# Patient Record
Sex: FEMALE | Race: BLACK OR AFRICAN AMERICAN | NOT HISPANIC OR LATINO | Employment: FULL TIME | URBAN - METROPOLITAN AREA
[De-identification: names, ages, dates, MRNs, and addresses within clinical notes are randomized per-mention and may not be internally consistent; named-entity substitution may affect disease eponyms.]

---

## 2023-02-28 ENCOUNTER — HOSPITAL ENCOUNTER (EMERGENCY)
Facility: HOSPITAL | Age: 28
Discharge: HOME | End: 2023-02-28
Attending: EMERGENCY MEDICINE | Admitting: EMERGENCY MEDICINE

## 2023-02-28 VITALS
HEART RATE: 85 BPM | RESPIRATION RATE: 16 BRPM | OXYGEN SATURATION: 98 % | TEMPERATURE: 98.6 F | SYSTOLIC BLOOD PRESSURE: 134 MMHG | DIASTOLIC BLOOD PRESSURE: 88 MMHG

## 2023-02-28 DIAGNOSIS — A08.4 VIRAL GASTROENTERITIS: Primary | ICD-10-CM

## 2023-02-28 LAB
ALBUMIN SERPL BCP-MCNC: 4.2 G/DL (ref 3.5–5)
ALP SERPL-CCNC: 45 U/L (ref 34–104)
ALT SERPL W P-5'-P-CCNC: 11 U/L (ref 7–52)
ANION GAP SERPL CALCULATED.3IONS-SCNC: 9 MMOL/L (ref 4–13)
AST SERPL W P-5'-P-CCNC: 16 U/L (ref 13–39)
BASOPHILS # BLD AUTO: 0.04 THOUSANDS/ÂΜL (ref 0–0.1)
BASOPHILS NFR BLD AUTO: 0 % (ref 0–1)
BILIRUB SERPL-MCNC: 0.55 MG/DL (ref 0.2–1)
BUN SERPL-MCNC: 8 MG/DL (ref 5–25)
CALCIUM SERPL-MCNC: 9.5 MG/DL (ref 8.4–10.2)
CHLORIDE SERPL-SCNC: 100 MMOL/L (ref 96–108)
CO2 SERPL-SCNC: 23 MMOL/L (ref 21–32)
CREAT SERPL-MCNC: 0.81 MG/DL (ref 0.6–1.3)
EOSINOPHIL # BLD AUTO: 0.15 THOUSAND/ÂΜL (ref 0–0.61)
EOSINOPHIL NFR BLD AUTO: 2 % (ref 0–6)
ERYTHROCYTE [DISTWIDTH] IN BLOOD BY AUTOMATED COUNT: 12.6 % (ref 11.6–15.1)
GFR SERPL CREATININE-BSD FRML MDRD: 98 ML/MIN/1.73SQ M
GLUCOSE SERPL-MCNC: 86 MG/DL (ref 65–140)
HCG SERPL QL: NEGATIVE
HCT VFR BLD AUTO: 41.5 % (ref 34.8–46.1)
HGB BLD-MCNC: 14.3 G/DL (ref 11.5–15.4)
IMM GRANULOCYTES # BLD AUTO: 0.02 THOUSAND/UL (ref 0–0.2)
IMM GRANULOCYTES NFR BLD AUTO: 0 % (ref 0–2)
LIPASE SERPL-CCNC: 110 U/L (ref 11–82)
LYMPHOCYTES # BLD AUTO: 2.25 THOUSANDS/ÂΜL (ref 0.6–4.47)
LYMPHOCYTES NFR BLD AUTO: 24 % (ref 14–44)
MCH RBC QN AUTO: 30 PG (ref 26.8–34.3)
MCHC RBC AUTO-ENTMCNC: 34.5 G/DL (ref 31.4–37.4)
MCV RBC AUTO: 87 FL (ref 82–98)
MONOCYTES # BLD AUTO: 0.75 THOUSAND/ÂΜL (ref 0.17–1.22)
MONOCYTES NFR BLD AUTO: 8 % (ref 4–12)
NEUTROPHILS # BLD AUTO: 6.15 THOUSANDS/ÂΜL (ref 1.85–7.62)
NEUTS SEG NFR BLD AUTO: 66 % (ref 43–75)
NRBC BLD AUTO-RTO: 0 /100 WBCS
PLATELET # BLD AUTO: 433 THOUSANDS/UL (ref 149–390)
PMV BLD AUTO: 8.5 FL (ref 8.9–12.7)
POTASSIUM SERPL-SCNC: 3.7 MMOL/L (ref 3.5–5.3)
PROT SERPL-MCNC: 7.7 G/DL (ref 6.4–8.4)
RBC # BLD AUTO: 4.77 MILLION/UL (ref 3.81–5.12)
SODIUM SERPL-SCNC: 132 MMOL/L (ref 135–147)
WBC # BLD AUTO: 9.36 THOUSAND/UL (ref 4.31–10.16)

## 2023-02-28 RX ORDER — ONDANSETRON 2 MG/ML
4 INJECTION INTRAMUSCULAR; INTRAVENOUS ONCE
Status: COMPLETED | OUTPATIENT
Start: 2023-02-28 | End: 2023-02-28

## 2023-02-28 RX ORDER — DICYCLOMINE HCL 20 MG
20 TABLET ORAL 2 TIMES DAILY
Qty: 20 TABLET | Refills: 0 | Status: SHIPPED | OUTPATIENT
Start: 2023-02-28

## 2023-02-28 RX ORDER — ONDANSETRON 4 MG/1
4 TABLET, ORALLY DISINTEGRATING ORAL EVERY 6 HOURS PRN
Qty: 20 TABLET | Refills: 0 | Status: SHIPPED | OUTPATIENT
Start: 2023-02-28

## 2023-02-28 RX ORDER — KETOROLAC TROMETHAMINE 30 MG/ML
15 INJECTION, SOLUTION INTRAMUSCULAR; INTRAVENOUS ONCE
Status: COMPLETED | OUTPATIENT
Start: 2023-02-28 | End: 2023-02-28

## 2023-02-28 RX ADMIN — SODIUM CHLORIDE 1000 ML: 0.9 INJECTION, SOLUTION INTRAVENOUS at 19:03

## 2023-02-28 RX ADMIN — KETOROLAC TROMETHAMINE 15 MG: 30 INJECTION, SOLUTION INTRAMUSCULAR at 19:04

## 2023-02-28 RX ADMIN — ONDANSETRON 4 MG: 2 INJECTION INTRAMUSCULAR; INTRAVENOUS at 19:04

## 2023-03-01 NOTE — DISCHARGE INSTRUCTIONS
Please return to the emergency department for worsening symptoms including chest pain, shortness of breath, dizziness, lightheadedness, fever greater than 103, severe pain, inability to walk, fainting episodes, etc  Please follow-up with your family practice provider as soon as possible  I have sent medications over to the pharmacy for your symptoms  Please take as directed

## 2023-03-01 NOTE — ED PROVIDER NOTES
History  Chief Complaint   Patient presents with   • Diarrhea     Pt states "I believe I have food poisoning" She has had 2 episodes of diarrhea no N/V  This is a 43-year-old female with no significant past medical history presenting to the emergency department today for nausea and diarrhea since yesterday  The patient notes she had it at [facility] and she believes that she got sick from this  She notes nonbloody diarrhea associated with generalized abdominal cramping  She notes nausea without any episodes of vomiting  She has no fever, chills, chest pain, or shortness of breath  She denies dysuria, hematuria, foul-smelling urine, vaginal bleeding, or vaginal discharge  She denies any prior abdominal surgical history  Nobody else is sick that the patient has been in contact with  The patient denies other complaints at this time  History provided by:  Patient   used: No    Diarrhea  Quality:  Watery  Severity:  Moderate  Onset quality:  Gradual  Timing:  Intermittent  Progression:  Worsening  Relieved by:  Nothing  Worsened by:  Nothing  Ineffective treatments:  None tried  Associated symptoms: abdominal pain ("cramping")    Associated symptoms: no arthralgias, no chills, no recent cough, no diaphoresis, no fever, no headaches, no myalgias, no URI and no vomiting    Risk factors: suspect food intake    Risk factors: no sick contacts        Prior to Admission Medications   Prescriptions Last Dose Informant Patient Reported? Taking?    Prenatal Vit-Fe Fumarate-FA (Prenatal Complete) 14-0 4 MG TABS   No No   Sig: Take 1 tablet by mouth daily   ibuprofen (MOTRIN) 600 mg tablet   No No   Sig: Take 1 tablet (600 mg total) by mouth every 6 (six) hours as needed for moderate pain for up to 5 days   metoclopramide (REGLAN) 10 mg tablet   No No   Sig: Take 1 tablet (10 mg total) by mouth every 6 (six) hours as needed (vomiting) for up to 12 doses   Patient not taking: Reported on 7/23/2021 naproxen (NAPROSYN) 500 mg tablet   No No   Sig: Take 1 tablet (500 mg total) by mouth 2 (two) times a day with meals   Patient not taking: Reported on 2021   norgestimate-ethinyl estradiol (Tri Femynor) 0 18/0 215/0 25 MG-35 MCG per tablet   No No   Sig: Take 1 tablet by mouth daily   Patient not taking: Reported on 2021   ondansetron (ZOFRAN-ODT) 4 mg disintegrating tablet   No No   Sig: Take 1 tablet (4 mg total) by mouth every 8 (eight) hours as needed for nausea or vomiting for up to 7 days      Facility-Administered Medications: None       Past Medical History:   Diagnosis Date   • Abnormal Pap smear of cervix    • HPV (human papilloma virus) infection    • Urinary tract infection    • Varicella     had the chicken pox       Past Surgical History:   Procedure Laterality Date   • INDUCED       by dilation and curettage       Family History   Problem Relation Age of Onset   • Asthma Mother    • Stroke Father    • Dementia Father    • Asthma Sister    • No Known Problems Brother    • Aneurysm Maternal Grandmother    • Hypertension Maternal Grandfather    • Stroke Paternal Grandfather    • No Known Problems Sister    • No Known Problems Sister    • No Known Problems Brother      I have reviewed and agree with the history as documented  E-Cigarette/Vaping   • E-Cigarette Use Never User      E-Cigarette/Vaping Substances     Social History     Tobacco Use   • Smoking status: Never   • Smokeless tobacco: Never   • Tobacco comments:     former smoker/ light smoker per [employer]    Vaping Use   • Vaping Use: Never used   Substance Use Topics   • Alcohol use: Yes     Comment: Socially   • Drug use: No       Review of Systems   Constitutional: Negative for appetite change, chills, diaphoresis, fatigue and fever  Eyes: Negative for visual disturbance  Respiratory: Negative for cough, chest tightness, shortness of breath and wheezing      Cardiovascular: Negative for chest pain, palpitations and leg swelling  Gastrointestinal: Positive for abdominal pain ("cramping"), diarrhea and nausea  Negative for constipation and vomiting  Musculoskeletal: Negative for arthralgias, myalgias, neck pain and neck stiffness  Skin: Negative for rash and wound  Neurological: Negative for dizziness, seizures, syncope, weakness, light-headedness, numbness and headaches  Psychiatric/Behavioral: Negative for confusion  All other systems reviewed and are negative  Physical Exam  Physical Exam  Vitals and nursing note reviewed  Constitutional:       General: She is not in acute distress  Appearance: Normal appearance  She is normal weight  She is not ill-appearing, toxic-appearing or diaphoretic  HENT:      Head: Normocephalic and atraumatic  Nose: Nose normal  No congestion or rhinorrhea  Mouth/Throat:      Mouth: Mucous membranes are moist       Pharynx: No oropharyngeal exudate or posterior oropharyngeal erythema  Eyes:      General: No scleral icterus  Right eye: No discharge  Left eye: No discharge  Extraocular Movements: Extraocular movements intact  Pupils: Pupils are equal, round, and reactive to light  Cardiovascular:      Rate and Rhythm: Normal rate and regular rhythm  Pulses: Normal pulses  Heart sounds: Normal heart sounds  No murmur heard  No friction rub  No gallop  Pulmonary:      Effort: Pulmonary effort is normal  No respiratory distress  Breath sounds: Normal breath sounds  No stridor  No wheezing, rhonchi or rales  Chest:      Chest wall: No tenderness  Abdominal:      General: Abdomen is flat  There is no distension  Palpations: Abdomen is soft  Tenderness: There is no abdominal tenderness  There is no right CVA tenderness, left CVA tenderness, guarding or rebound  Comments: Abdomen is soft, nontender, nondistended, and without organomegaly   Musculoskeletal:         General: Normal range of motion  Cervical back: Normal range of motion  No tenderness  Right lower leg: No edema  Left lower leg: No edema  Skin:     General: Skin is warm and dry  Capillary Refill: Capillary refill takes less than 2 seconds  Coloration: Skin is not jaundiced or pale  Neurological:      General: No focal deficit present  Mental Status: She is alert and oriented to person, place, and time  Mental status is at baseline     Psychiatric:         Mood and Affect: Mood normal          Behavior: Behavior normal          Vital Signs  ED Triage Vitals [02/28/23 1829]   Temperature Pulse Respirations Blood Pressure SpO2   98 6 °F (37 °C) 85 16 134/88 98 %      Temp Source Heart Rate Source Patient Position - Orthostatic VS BP Location FiO2 (%)   Oral -- Lying Right arm --      Pain Score       6           Vitals:    02/28/23 1829   BP: 134/88   Pulse: 85   Patient Position - Orthostatic VS: Lying         Visual Acuity      ED Medications  Medications   sodium chloride 0 9 % bolus 1,000 mL (0 mL Intravenous Stopped 2/28/23 2001)   ondansetron (ZOFRAN) injection 4 mg (4 mg Intravenous Given 2/28/23 1904)   ketorolac (TORADOL) injection 15 mg (15 mg Intravenous Given 2/28/23 1904)       Diagnostic Studies  Results Reviewed     Procedure Component Value Units Date/Time    hCG, qualitative pregnancy [[id number]]  (Normal) Collected: 02/28/23 1907    Lab Status: Final result Specimen: Blood from Arm, Right Updated: 02/28/23 1935     Preg, Serum Negative    Comprehensive metabolic panel [[id number]]  (Abnormal) Collected: 02/28/23 1907    Lab Status: Final result Specimen: Blood from Arm, Right Updated: 02/28/23 1930     Sodium 132 mmol/L      Potassium 3 7 mmol/L      Chloride 100 mmol/L      CO2 23 mmol/L      ANION GAP 9 mmol/L      BUN 8 mg/dL      Creatinine 0 81 mg/dL      Glucose 86 mg/dL      Calcium 9 5 mg/dL      AST 16 U/L      ALT 11 U/L      Alkaline Phosphatase 45 U/L      Total Protein 7 7 g/dL      Albumin 4 2 g/dL      Total Bilirubin 0 55 mg/dL      eGFR 98 ml/min/1 73sq m     Narrative:      National Kid[employer] guidelines for Chronic Kidney Disease (CKD):   •  Stage 1 with normal or high GFR (GFR > 90 mL/min/1 73 square meters)  •  Stage 2 Mild CKD (GFR = 60-89 mL/min/1 73 square meters)  •  Stage 3A Moderate CKD (GFR = 45-59 mL/min/1 73 square meters)  •  Stage 3B Moderate CKD (GFR = 30-44 mL/min/1 73 square meters)  •  Stage 4 Severe CKD (GFR = 15-29 mL/min/1 73 square meters)  •  Stage 5 End Stage CKD (GFR <15 mL/min/1 73 square meters)  Note: GFR calculation is accurate only with a steady state creatinine    Lipase [[id number]]  (Abnormal) Collected: 02/28/23 1907    Lab Status: Final result Specimen: Blood from Arm, Right Updated: 02/28/23 1930     Lipase 110 u/L     CBC and differential [[id number]]  (Abnormal) Collected: 02/28/23 1907    Lab Status: Final result Specimen: Blood from Arm, Right Updated: 02/28/23 1911     WBC 9 36 Thousand/uL      RBC 4 77 Million/uL      Hemoglobin 14 3 g/dL      Hematocrit 41 5 %      MCV 87 fL      MCH 30 0 pg      MCHC 34 5 g/dL      RDW 12 6 %      MPV 8 5 fL      Platelets 873 Thousands/uL      nRBC 0 /100 WBCs      Neutrophils Relative 66 %      Immat GRANS % 0 %      Lymphocytes Relative 24 %      Monocytes Relative 8 %      Eosinophils Relative 2 %      Basophils Relative 0 %      Neutrophils Absolute 6 15 Thousands/µL      Immature Grans Absolute 0 02 Thousand/uL      Lymphocytes Absolute 2 25 Thousands/µL      Monocytes Absolute 0 75 Thousand/µL      Eosinophils Absolute 0 15 Thousand/µL      Basophils Absolute 0 04 Thousands/µL                  No orders to display              Procedures  Procedures         ED Course                                             Medical Decision Making  This is a 79-year-old female presenting to the emergency department today for nonbloody diarrhea associated with nausea  Suspicous food intake yesterday    Nobody else is sick  No episodes of vomiting  No fevers  No abdominal surgical history  Vitals are stable  The patient has generalized abdominal cramping but abdominal examination is benign  Her abdomen is soft, nontender, nondistended, and without organomegaly  Labs show mild hyponatremia and mild elevation to lipase but is not definitive for pancreatitis  The patient was given intravenous fluids in addition to Zofran and Toradol while here in the emergency department  This made the patient feel better  Negative pregnancy test   The patient is stable for discharge at this time  Zofran and Bentyl sent to the patient's pharmacy  Push fluids  [place] diet and advance as tolerated  Strict return precautions were given  Recommend PCP follow-up as soon as possible  The patient and/or patient's proxy verify their understanding and agree to the plan at this time  All questions answered to the patient and/or their proxy's satisfaction  All labs reviewed and utilized in the medical decision making process (if labs were ordered)  Portions of the record may have been created with voice recognition software   Occasional wrong word or "sound a like" substitutions may have occurred due to the inherent limitations of voice recognition software   Read the chart carefully and recognize, using context, where substitutions have occurred  Viral gastroenteritis: complicated acute illness or injury  Amount and/or Complexity of Data Reviewed  Labs: ordered  Decision-making details documented in ED Course  Risk  Prescription drug management            Disposition  Final diagnoses:   Viral gastroenteritis     Time reflects when diagnosis was documented in both MDM as applicable and the Disposition within this note     Time User Action Codes Description Comment    2/28/2023  7:45 PM Britni Hill Add [A08 4] Viral gastroenteritis       ED Disposition     ED Disposition   Discharge    Condition   Stable    Date/Time   Tue Feb 28, 2023  7:44 PM    Comment   Roseannekari Tobar discharge to home/self care  Follow-up Information     Follow up With Specialties Details Why Contact Info Additional Information    Dell Zamora MD Internal Medicine Schedule an appointment as soon as possible for a visit   [address] [facility] Emergency Department Emergency Medicine Go to  If symptoms worsen [address] [id number]  [address] [facility] Emergency Department, [address], [place] [address]          Patient's Medications   Discharge Prescriptions    DICYCLOMINE (BENTYL) 20 MG TABLET    Take 1 tablet (20 mg total) by mouth 2 (two) times a day       Start Date: 2/28/2023 End Date: --       Order Dose: 20 mg       Quantity: 20 tablet    Refills: 0    ONDANSETRON (ZOFRAN-ODT) 4 MG DISINTEGRATING TABLET    Take 1 tablet (4 mg total) by mouth every 6 (six) hours as needed for nausea or vomiting       Start Date: 2/28/2023 End Date: --       Order Dose: 4 mg       Quantity: 20 tablet    Refills: 0       No discharge procedures on file      PDMP Review     None          ED Provider  Electronically Signed by           Connie Cano PA-C  02/28/23 2012

## 2024-01-20 ENCOUNTER — APPOINTMENT (EMERGENCY)
Dept: CT IMAGING | Facility: HOSPITAL | Age: 29
End: 2024-01-20
Payer: COMMERCIAL

## 2024-01-20 ENCOUNTER — HOSPITAL ENCOUNTER (EMERGENCY)
Facility: HOSPITAL | Age: 29
Discharge: HOME | End: 2024-01-20
Attending: EMERGENCY MEDICINE
Payer: COMMERCIAL

## 2024-01-20 ENCOUNTER — APPOINTMENT (EMERGENCY)
Dept: ULTRASOUND IMAGING | Facility: HOSPITAL | Age: 29
End: 2024-01-20
Payer: COMMERCIAL

## 2024-01-20 VITALS
RESPIRATION RATE: 18 BRPM | SYSTOLIC BLOOD PRESSURE: 131 MMHG | DIASTOLIC BLOOD PRESSURE: 79 MMHG | OXYGEN SATURATION: 99 % | HEART RATE: 88 BPM | TEMPERATURE: 98.2 F

## 2024-01-20 DIAGNOSIS — R11.0 NAUSEA: ICD-10-CM

## 2024-01-20 DIAGNOSIS — N83.209 OVARIAN CYST: ICD-10-CM

## 2024-01-20 DIAGNOSIS — R10.9 ABDOMINAL PAIN: Primary | ICD-10-CM

## 2024-01-20 LAB
ALBUMIN SERPL BCP-MCNC: 4.2 G/DL (ref 3.5–5)
ALP SERPL-CCNC: 54 U/L (ref 34–104)
ALT SERPL W P-5'-P-CCNC: 12 U/L (ref 7–52)
ANION GAP SERPL CALCULATED.3IONS-SCNC: 9 MMOL/L
AST SERPL W P-5'-P-CCNC: 17 U/L (ref 13–39)
BACTERIA UR QL AUTO: ABNORMAL /HPF
BASOPHILS # BLD AUTO: 0.06 THOUSANDS/ÂΜL (ref 0–0.1)
BASOPHILS NFR BLD AUTO: 1 % (ref 0–1)
BILIRUB DIRECT SERPL-MCNC: 0.11 MG/DL (ref 0–0.2)
BILIRUB SERPL-MCNC: 0.34 MG/DL (ref 0.2–1)
BILIRUB UR QL STRIP: NEGATIVE
BUN SERPL-MCNC: 8 MG/DL (ref 5–25)
CALCIUM SERPL-MCNC: 9.3 MG/DL (ref 8.4–10.2)
CHLORIDE SERPL-SCNC: 102 MMOL/L (ref 96–108)
CLARITY UR: CLEAR
CO2 SERPL-SCNC: 23 MMOL/L (ref 21–32)
COLOR UR: YELLOW
CREAT SERPL-MCNC: 0.8 MG/DL (ref 0.6–1.3)
EOSINOPHIL # BLD AUTO: 0.43 THOUSAND/ÂΜL (ref 0–0.61)
EOSINOPHIL NFR BLD AUTO: 5 % (ref 0–6)
ERYTHROCYTE [DISTWIDTH] IN BLOOD BY AUTOMATED COUNT: 12.8 % (ref 11.6–15.1)
EXT PREGNANCY TEST URINE: NEGATIVE
EXT. CONTROL: NORMAL
GFR SERPL CREATININE-BSD FRML MDRD: 99 ML/MIN/1.73SQ M
GLUCOSE SERPL-MCNC: 93 MG/DL (ref 65–140)
GLUCOSE UR STRIP-MCNC: NEGATIVE MG/DL
HCG SERPL QL: NEGATIVE
HCT VFR BLD AUTO: 40.4 % (ref 34.8–46.1)
HGB BLD-MCNC: 13.5 G/DL (ref 11.5–15.4)
HGB UR QL STRIP.AUTO: ABNORMAL
IMM GRANULOCYTES # BLD AUTO: 0.02 THOUSAND/UL (ref 0–0.2)
IMM GRANULOCYTES NFR BLD AUTO: 0 % (ref 0–2)
KETONES UR STRIP-MCNC: ABNORMAL MG/DL
LEUKOCYTE ESTERASE UR QL STRIP: NEGATIVE
LIPASE SERPL-CCNC: 29 U/L (ref 11–82)
LYMPHOCYTES # BLD AUTO: 2.08 THOUSANDS/ÂΜL (ref 0.6–4.47)
LYMPHOCYTES NFR BLD AUTO: 22 % (ref 14–44)
MCH RBC QN AUTO: 30.1 PG (ref 26.8–34.3)
MCHC RBC AUTO-ENTMCNC: 33.4 G/DL (ref 31.4–37.4)
MCV RBC AUTO: 90 FL (ref 82–98)
MONOCYTES # BLD AUTO: 0.82 THOUSAND/ÂΜL (ref 0.17–1.22)
MONOCYTES NFR BLD AUTO: 9 % (ref 4–12)
MUCOUS THREADS UR QL AUTO: ABNORMAL
NEUTROPHILS # BLD AUTO: 6.08 THOUSANDS/ÂΜL (ref 1.85–7.62)
NEUTS SEG NFR BLD AUTO: 63 % (ref 43–75)
NITRITE UR QL STRIP: NEGATIVE
NON-SQ EPI CELLS URNS QL MICRO: ABNORMAL /HPF
NRBC BLD AUTO-RTO: 0 /100 WBCS
PH UR STRIP.AUTO: 6 [PH]
PLATELET # BLD AUTO: 386 THOUSANDS/UL (ref 149–390)
PMV BLD AUTO: 8.4 FL (ref 8.9–12.7)
POTASSIUM SERPL-SCNC: 3.6 MMOL/L (ref 3.5–5.3)
PROT SERPL-MCNC: 8 G/DL (ref 6.4–8.4)
PROT UR STRIP-MCNC: NEGATIVE MG/DL
RBC # BLD AUTO: 4.48 MILLION/UL (ref 3.81–5.12)
RBC #/AREA URNS AUTO: ABNORMAL /HPF
SODIUM SERPL-SCNC: 134 MMOL/L (ref 135–147)
SP GR UR STRIP.AUTO: >=1.03 (ref 1–1.03)
UROBILINOGEN UR QL STRIP.AUTO: 0.2 E.U./DL
WBC # BLD AUTO: 9.49 THOUSAND/UL (ref 4.31–10.16)
WBC #/AREA URNS AUTO: ABNORMAL /HPF

## 2024-01-20 PROCEDURE — 80076 HEPATIC FUNCTION PANEL: CPT | Performed by: EMERGENCY MEDICINE

## 2024-01-20 PROCEDURE — 96375 TX/PRO/DX INJ NEW DRUG ADDON: CPT

## 2024-01-20 PROCEDURE — 76830 TRANSVAGINAL US NON-OB: CPT

## 2024-01-20 PROCEDURE — 81025 URINE PREGNANCY TEST: CPT | Performed by: EMERGENCY MEDICINE

## 2024-01-20 PROCEDURE — 84703 CHORIONIC GONADOTROPIN ASSAY: CPT | Performed by: EMERGENCY MEDICINE

## 2024-01-20 PROCEDURE — 96361 HYDRATE IV INFUSION ADD-ON: CPT

## 2024-01-20 PROCEDURE — 36415 COLL VENOUS BLD VENIPUNCTURE: CPT | Performed by: EMERGENCY MEDICINE

## 2024-01-20 PROCEDURE — 81001 URINALYSIS AUTO W/SCOPE: CPT | Performed by: EMERGENCY MEDICINE

## 2024-01-20 PROCEDURE — 80048 BASIC METABOLIC PNL TOTAL CA: CPT | Performed by: EMERGENCY MEDICINE

## 2024-01-20 PROCEDURE — 99284 EMERGENCY DEPT VISIT MOD MDM: CPT

## 2024-01-20 PROCEDURE — 85025 COMPLETE CBC W/AUTO DIFF WBC: CPT | Performed by: EMERGENCY MEDICINE

## 2024-01-20 PROCEDURE — 76856 US EXAM PELVIC COMPLETE: CPT

## 2024-01-20 PROCEDURE — 96374 THER/PROPH/DIAG INJ IV PUSH: CPT

## 2024-01-20 PROCEDURE — 83690 ASSAY OF LIPASE: CPT | Performed by: EMERGENCY MEDICINE

## 2024-01-20 PROCEDURE — 74177 CT ABD & PELVIS W/CONTRAST: CPT

## 2024-01-20 PROCEDURE — 99285 EMERGENCY DEPT VISIT HI MDM: CPT | Performed by: EMERGENCY MEDICINE

## 2024-01-20 RX ORDER — ONDANSETRON 4 MG/1
4 TABLET, ORALLY DISINTEGRATING ORAL EVERY 6 HOURS PRN
Qty: 20 TABLET | Refills: 0 | Status: SHIPPED | OUTPATIENT
Start: 2024-01-20

## 2024-01-20 RX ORDER — ONDANSETRON 2 MG/ML
4 INJECTION INTRAMUSCULAR; INTRAVENOUS ONCE
Status: COMPLETED | OUTPATIENT
Start: 2024-01-20 | End: 2024-01-20

## 2024-01-20 RX ORDER — KETOROLAC TROMETHAMINE 30 MG/ML
15 INJECTION, SOLUTION INTRAMUSCULAR; INTRAVENOUS ONCE
Status: COMPLETED | OUTPATIENT
Start: 2024-01-20 | End: 2024-01-20

## 2024-01-20 RX ADMIN — ONDANSETRON 4 MG: 2 INJECTION INTRAMUSCULAR; INTRAVENOUS at 17:09

## 2024-01-20 RX ADMIN — IOHEXOL 100 ML: 350 INJECTION, SOLUTION INTRAVENOUS at 17:37

## 2024-01-20 RX ADMIN — KETOROLAC TROMETHAMINE 15 MG: 30 INJECTION, SOLUTION INTRAMUSCULAR; INTRAVENOUS at 17:09

## 2024-01-20 RX ADMIN — SODIUM CHLORIDE 500 ML: 0.9 INJECTION, SOLUTION INTRAVENOUS at 17:51

## 2024-01-20 NOTE — ED PROVIDER NOTES
History  Chief Complaint   Patient presents with    Abdominal Pain     Pt reports midline abdominal pain x 2 weeks, nauseous, HA. No meds PTA     28-year-old female previous history of HPV, UTI.  Patient presents for abdominal pain for the last 2 weeks associated with nausea.  No emesis.  Triage note noted a complaint of headaches.  Patient states that she gets daily headaches but has no headache currently as she took an Midol for it which relieved the headache.  Headaches are not maximal in onset.  She denies change in vision, hearing, numbness, tingling, weakness, weight loss.    She notes 1 episode of diarrhea today.    Abdominal pain feels like a fullness/cramping.  Located around the bellybutton as well as the right lower quadrant.    No vaginal discharge.  Denies dysuria, hematuria, increased frequency of urination.      Abdominal Pain  Pain location:  Generalized  Pain quality: aching and fullness    Pain radiates to:  Does not radiate  Onset quality:  Gradual  Timing:  Constant  Progression:  Unchanged  Chronicity:  New  Associated symptoms: nausea        Prior to Admission Medications   Prescriptions Last Dose Informant Patient Reported? Taking?   Prenatal Vit-Fe Fumarate-FA (Prenatal Complete) 14-0.4 MG TABS   No No   Sig: Take 1 tablet by mouth daily   dicyclomine (BENTYL) 20 mg tablet   No No   Sig: Take 1 tablet (20 mg total) by mouth 2 (two) times a day   ibuprofen (MOTRIN) 600 mg tablet   No No   Sig: Take 1 tablet (600 mg total) by mouth every 6 (six) hours as needed for moderate pain for up to 5 days   metoclopramide (REGLAN) 10 mg tablet   No No   Sig: Take 1 tablet (10 mg total) by mouth every 6 (six) hours as needed (vomiting) for up to 12 doses   Patient not taking: Reported on 7/23/2021   naproxen (NAPROSYN) 500 mg tablet   No No   Sig: Take 1 tablet (500 mg total) by mouth 2 (two) times a day with meals   Patient not taking: Reported on 7/23/2021   norgestimate-ethinyl estradiol (Tri Femynor)  0.18/0.215/0.25 MG-35 MCG per tablet   No No   Sig: Take 1 tablet by mouth daily   Patient not taking: Reported on 2021   ondansetron (ZOFRAN-ODT) 4 mg disintegrating tablet   No No   Sig: Take 1 tablet (4 mg total) by mouth every 6 (six) hours as needed for nausea or vomiting      Facility-Administered Medications: None       Past Medical History:   Diagnosis Date    Abnormal Pap smear of cervix     HPV (human papilloma virus) infection     Urinary tract infection     Varicella     had the chicken pox       Past Surgical History:   Procedure Laterality Date    INDUCED       by dilation and curettage       Family History   Problem Relation Age of Onset    Asthma Mother     Stroke Father     Dementia Father     Asthma Sister     No Known Problems Brother     Aneurysm Maternal Grandmother     Hypertension Maternal Grandfather     Stroke Paternal Grandfather     No Known Problems Sister     No Known Problems Sister     No Known Problems Brother      I have reviewed and agree with the history as documented.    E-Cigarette/Vaping    E-Cigarette Use Never User      E-Cigarette/Vaping Substances     Social History     Tobacco Use    Smoking status: Never    Smokeless tobacco: Never    Tobacco comments:     former smoker/ light smoker per [employer]scripts    Vaping Use    Vaping status: Never Used   Substance Use Topics    Alcohol use: Yes     Comment: Socially    Drug use: No       Review of Systems   Gastrointestinal:  Positive for abdominal pain and nausea.   All other systems reviewed and are negative.      Physical Exam  Physical Exam  Vitals and nursing note reviewed.   Constitutional:       General: She is not in acute distress.     Appearance: Normal appearance. She is not ill-appearing.   HENT:      Head: Normocephalic and atraumatic.      Right Ear: External ear normal.      Left Ear: External ear normal.      Nose: Nose normal.      Mouth/Throat:      Mouth: Mucous membranes are moist.   Eyes:       General:         Right eye: No discharge.         Left eye: No discharge.      Conjunctiva/sclera: Conjunctivae normal.   Cardiovascular:      Rate and Rhythm: Normal rate and regular rhythm.      Pulses: Normal pulses.      Heart sounds: No murmur heard.  Pulmonary:      Effort: Pulmonary effort is normal.      Breath sounds: Normal breath sounds.   Abdominal:      General: Abdomen is flat. There is no distension.      Tenderness: There is abdominal tenderness in the right lower quadrant and periumbilical area.   Musculoskeletal:         General: Normal range of motion.      Cervical back: Normal range of motion.   Skin:     General: Skin is warm.      Capillary Refill: Capillary refill takes less than 2 seconds.      Findings: No rash.   Neurological:      General: No focal deficit present.      Mental Status: She is alert. Mental status is at baseline.   Psychiatric:         Mood and Affect: Mood normal.         Behavior: Behavior normal.         Vital Signs  ED Triage Vitals   Temperature Pulse Respirations Blood Pressure SpO2   01/20/24 1626 01/20/24 1625 01/20/24 1625 01/20/24 1626 01/20/24 1625   98.2 °F (36.8 °C) 91 16 135/87 99 %      Temp Source Heart Rate Source Patient Position - Orthostatic VS BP Location FiO2 (%)   01/20/24 1626 01/20/24 1825 01/20/24 1825 01/20/24 1825 --   Oral Monitor Lying Right arm       Pain Score       --                  Vitals:    01/20/24 1625 01/20/24 1626 01/20/24 1825   BP:  135/87 131/79   Pulse: 91  88   Patient Position - Orthostatic VS:   Lying         Visual Acuity      ED Medications  Medications   ketorolac (TORADOL) injection 15 mg (15 mg Intravenous Given 1/20/24 1709)   ondansetron (ZOFRAN) injection 4 mg (4 mg Intravenous Given 1/20/24 1709)   sodium chloride 0.9 % bolus 500 mL (0 mL Intravenous Stopped 1/20/24 2025)   iohexol (OMNIPAQUE) 350 MG/ML injection (SINGLE-DOSE) 100 mL (100 mL Intravenous Given 1/20/24 1737)       Diagnostic Studies  Results Reviewed        Procedure Component Value Units Date/Time    hCG, qualitative pregnancy [[id number]]  (Normal) Collected: 01/20/24 1704    Lab Status: Final result Specimen: Blood from Arm, Right Updated: 01/20/24 1732     Preg, Serum Negative    Basic metabolic panel [[id number]]  (Abnormal) Collected: 01/20/24 1704    Lab Status: Final result Specimen: Blood from Arm, Right Updated: 01/20/24 1724     Sodium 134 mmol/L      Potassium 3.6 mmol/L      Chloride 102 mmol/L      CO2 23 mmol/L      ANION GAP 9 mmol/L      BUN 8 mg/dL      Creatinine 0.80 mg/dL      Glucose 93 mg/dL      Calcium 9.3 mg/dL      eGFR 99 ml/min/1.73sq m     Narrative:      National Kidney Disease Foundation guidelines for Chronic Kidney Disease (CKD):     Stage 1 with normal or high GFR (GFR > 90 mL/min/1.73 square meters)    Stage 2 Mild CKD (GFR = 60-89 mL/min/1.73 square meters)    Stage 3A Moderate CKD (GFR = 45-59 mL/min/1.73 square meters)    Stage 3B Moderate CKD (GFR = 30-44 mL/min/1.73 square meters)    Stage 4 Severe CKD (GFR = 15-29 mL/min/1.73 square meters)    Stage 5 End Stage CKD (GFR <15 mL/min/1.73 square meters)  Note: GFR calculation is accurate only with a steady state creatinine    Hepatic function panel [[id number]]  (Normal) Collected: 01/20/24 1704    Lab Status: Final result Specimen: Blood from Arm, Right Updated: 01/20/24 1724     Total Bilirubin 0.34 mg/dL      Bilirubin, Direct 0.11 mg/dL      Alkaline Phosphatase 54 U/L      AST 17 U/L      ALT 12 U/L      Total Protein 8.0 g/dL      Albumin 4.2 g/dL     Lipase [[id number]]  (Normal) Collected: 01/20/24 1704    Lab Status: Final result Specimen: Blood from Arm, Right Updated: 01/20/24 1724     Lipase 29 u/L     Urine Microscopic [[id number]]  (Abnormal) Collected: 01/20/24 1704    Lab Status: Final result Specimen: Urine, Clean Catch Updated: 01/20/24 1724     RBC, UA None Seen /hpf      WBC, UA 0-1 /hpf      Epithelial Cells Moderate /hpf      Bacteria, UA Moderate /hpf       MUCUS THREADS Moderate    POCT pregnancy, urine [[id number]]  (Normal) Resulted: 01/20/24 1714    Lab Status: Final result Updated: 01/20/24 1714     EXT Preg Test, Ur Negative     Control Valid    UA w Reflex to Microscopic w Reflex to Culture [[id number]]  (Abnormal) Collected: 01/20/24 1704    Lab Status: Final result Specimen: Urine, Clean Catch Updated: 01/20/24 1710     Color, UA Yellow     Clarity, UA Clear     Specific Gravity, UA >=1.030     pH, UA 6.0     Leukocytes, UA Negative     Nitrite, UA Negative     Protein, UA Negative mg/dl      Glucose, UA Negative mg/dl      Ketones, UA 80 (3+) mg/dl      Urobilinogen, UA 0.2 E.U./dl      Bilirubin, UA Negative     Occult Blood, UA Trace-Intact    CBC and differential [[id number]]  (Abnormal) Collected: 01/20/24 1704    Lab Status: Final result Specimen: Blood from Arm, Right Updated: 01/20/24 1709     WBC 9.49 Thousand/uL      RBC 4.48 Million/uL      Hemoglobin 13.5 g/dL      Hematocrit 40.4 %      MCV 90 fL      MCH 30.1 pg      MCHC 33.4 g/dL      RDW 12.8 %      MPV 8.4 fL      Platelets 386 Thousands/uL      nRBC 0 /100 WBCs      Neutrophils Relative 63 %      Immat GRANS % 0 %      Lymphocytes Relative 22 %      Monocytes Relative 9 %      Eosinophils Relative 5 %      Basophils Relative 1 %      Neutrophils Absolute 6.08 Thousands/µL      Immature Grans Absolute 0.02 Thousand/uL      Lymphocytes Absolute 2.08 Thousands/µL      Monocytes Absolute 0.82 Thousand/µL      Eosinophils Absolute 0.43 Thousand/µL      Basophils Absolute 0.06 Thousands/µL                    US pelvis complete w transvaginal   Final Result by Jaret Maxwell DO (01/20 2116)   Mildly enlarged uterus.   Right ovarian corpus luteal cyst.   No evidence of ovarian torsion at time of imaging.                              Workstation performed: [id number]         CT abdomen pelvis with contrast   Final Result by Dario Quigley MD (01/20 1838)      No evidence for acute appendicitis.      2.5  cm right ovarian cyst with a small amount of pelvic free fluid.      Nonspecific fluid-filled loops of small bowel without evidence for bowel obstruction.         Workstation performed: [id number]                    Procedures  Procedures         ED Course  ED Course as of 01/20/24 2322   Sat Jan 20, 2024   1730 Epithelial Cells(!): Moderate  Dirty Catch     1733 Ketones, UA(!): 80 (3+)  Unsure etiology. No AG or acidosis. No hien   1932 Informed pt of ovarian cyst. She is scheduled to see her OBGYN later this month                                             Medical Decision Making  Will evaluate for acute intra-abdominal processes including but not limited to diverticulitis, colitis, appendicitis, pancreatitis, pregnancy, UTI.    Workup with ovarian cysts.  Will evaluate for ovarian torsion.  No signs of torsion.    Urine is a dirty catch.  Will send for culture.    Will refer to gastroenterology for the chronic abdominal pain.    Scheduled to follow-up with her OB/GYN.  Recommended mentioning the ovarian cyst.    Problems Addressed:  Abdominal pain: acute illness or injury  Nausea: acute illness or injury  Ovarian cyst: acute illness or injury    Amount and/or Complexity of Data Reviewed  Labs: ordered. Decision-making details documented in ED Course.  Radiology: ordered.    Risk  Prescription drug management.             Disposition  Final diagnoses:   Abdominal pain   Ovarian cyst   Nausea     Time reflects when diagnosis was documented in both MDM as applicable and the Disposition within this note       Time User Action Codes Description Comment    1/20/2024  9:36 PM Donta Mayfield [R10.9] Abdominal pain     1/20/2024  9:36 PM Donta Mayfield [N83.209] Ovarian cyst     1/20/2024  9:36 PM Donta Mayfield [R11.0] Nausea           ED Disposition       ED Disposition   Discharge    Condition   Stable    Date/Time   Sat Jan 20, 2024  9:36 PM    Comment   Rebecca Garrison discharge to home/self  care.                   Follow-up Information       Follow up With Specialties Details Why Contact Info Additional Information    [facility] Emergency Department Emergency Medicine  If symptoms worsen [address] [facility] [address] [facility] [address]  [phone] [facility] Emergency Department, [address] [place] [id number]    [facility] [facility] Schedule an appointment as soon as possible for a visit  For re-evaluation as soon as possible [address] [facility]  [facility] [address] [phone] [facility] [place], [address], [address], [place], [place], [address] [phone]    Your OB/Gyn  Schedule an appointment as soon as possible for a visit  For re-evaluation as soon as possible              Discharge Medication List as of 1/20/2024  9:37 PM        CONTINUE these medications which have CHANGED    Details   ondansetron (Zofran ODT) 4 mg disintegrating tablet Take 1 tablet (4 mg total) by mouth every 6 (six) hours as needed for nausea or vomiting, Starting Sat 1/20/2024, Normal           CONTINUE these medications which have NOT CHANGED    Details   dicyclomine (BENTYL) 20 mg tablet Take 1 tablet (20 mg total) by mouth 2 (two) times a day, Starting Tue 2/28/2023, Normal      ibuprofen (MOTRIN) 600 mg tablet Take 1 tablet (600 mg total) by mouth every 6 (six) hours as needed for moderate pain for up to 5 days, Starting Mon 7/4/2022, Until Sat 7/9/2022 at 2359, Print      metoclopramide (REGLAN) 10 mg tablet Take 1 tablet (10 mg total) by mouth every 6 (six) hours as needed (vomiting) for up to 12 doses, Starting Wed 1/13/2021, Normal      naproxen (NAPROSYN) 500 mg tablet Take 1 tablet (500 mg total) by mouth 2 (two) times a day with meals, Starting Fri 4/16/2021, Normal      norgestimate-ethinyl estradiol (Tri Femynor) 0.18/0.215/0.25 MG-35 MCG per tablet Take 1 tablet  by mouth daily, Starting Sat 4/17/2021, Normal      Prenatal Vit-Fe Fumarate-FA (Prenatal Complete) 14-0.4 MG TABS Take 1 tablet by mouth daily, Starting Wed 9/29/2021, Until Tue 12/28/2021, Print                 PDMP Review       None            ED Provider  Electronically Signed by             Donta Mayfield,   01/20/24 2317

## 2024-01-21 NOTE — DISCHARGE INSTRUCTIONS
CT scan showed an ovarian cyst on your right ovary.  Follow-up with your OB/GYN about this.  This is a relatively common finding.    Follow-up with gastroenterology regarding your abdominal pain.    Take the Zofran every 6 hours as needed for your nausea.    Come back for new or worsening symptoms.

## 2024-01-24 ENCOUNTER — TELEPHONE (OUTPATIENT)
Dept: NEUROLOGY | Facility: CLINIC | Age: 29
End: 2024-01-24

## 2024-01-24 NOTE — TELEPHONE ENCOUNTER
Rebecca Garrison called to sche a New Patient appt - no referral so created a self referral. Patient was triaged, sent over to provider, advised of process, referral status was updated, insurance was verified.    [facility]

## 2024-01-31 ENCOUNTER — TELEPHONE (OUTPATIENT)
Dept: GASTROENTEROLOGY | Facility: MEDICAL CENTER | Age: 29
End: 2024-01-31

## 2024-01-31 ENCOUNTER — OFFICE VISIT (OUTPATIENT)
Dept: GASTROENTEROLOGY | Facility: MEDICAL CENTER | Age: 29
End: 2024-01-31
Payer: COMMERCIAL

## 2024-01-31 VITALS
BODY MASS INDEX: 30.31 KG/M2 | SYSTOLIC BLOOD PRESSURE: 122 MMHG | WEIGHT: 188.6 LBS | HEIGHT: 66 IN | HEART RATE: 70 BPM | TEMPERATURE: 98.8 F | DIASTOLIC BLOOD PRESSURE: 66 MMHG | OXYGEN SATURATION: 98 %

## 2024-01-31 DIAGNOSIS — R10.9 ABDOMINAL PAIN: ICD-10-CM

## 2024-01-31 DIAGNOSIS — K62.5 RECTAL BLEEDING: Primary | ICD-10-CM

## 2024-01-31 DIAGNOSIS — K59.04 CHRONIC IDIOPATHIC CONSTIPATION: ICD-10-CM

## 2024-01-31 DIAGNOSIS — R11.0 NAUSEA: ICD-10-CM

## 2024-01-31 PROCEDURE — 99244 OFF/OP CNSLTJ NEW/EST MOD 40: CPT | Performed by: NURSE PRACTITIONER

## 2024-01-31 RX ORDER — POLYETHYLENE GLYCOL 3350, SODIUM SULFATE ANHYDROUS, SODIUM BICARBONATE, SODIUM CHLORIDE, POTASSIUM CHLORIDE 236; 22.74; 6.74; 5.86; 2.97 G/4L; G/4L; G/4L; G/4L; G/4L
4000 POWDER, FOR SOLUTION ORAL ONCE
Qty: 4000 ML | Refills: 0 | Status: SHIPPED | OUTPATIENT
Start: 2024-01-31 | End: 2024-01-31

## 2024-01-31 NOTE — PATIENT INSTRUCTIONS
MiralaModerate Sedation   AMBULATORY CARE:   What you need to know about moderate sedation:  Moderate sedation, or conscious sedation, is medicine used during procedures to help you feel relaxed and calm. You will be awake and able to follow directions without anxiety or pain. You will remember little to none of the procedure. Moderate sedation can be used for procedures such as a colonoscopy, wound repair, cataract removal, or dental work. The medicine is given as a pill, shot, inhaled solution, or injection through an IV.  How to prepare for moderate sedation:  Your healthcare provider will talk to you about how to prepare for moderate sedation. You may be told not to eat or drink anything for 8 hours before moderate sedation. You may be able to drink clear liquids up until 2 hours before moderate sedation. Tell healthcare providers if you have any allergies, heart problems, or breathing problems. Arrange for someone to drive you home and stay with you for 24 hours. You may feel sleepy and need help doing things at home. Another person may need to call 911 if you cannot be woken.  What will happen during moderate sedation:  Your healthcare provider will give you enough medicine to keep you relaxed and calm. Your healthcare provider will monitor your blood pressure, heart rate, and breathing. You will be on a heart monitor and a pulse oximeter. A heart monitor is a safety device that stays on continuously to record your heart's electrical activity. A pulse oximeter is a device that measures the amount of oxygen in your blood. You may get oxygen through a mask placed over your nose and mouth or through small tubes placed in your nostrils.  What will happen after moderate sedation:  Healthcare providers will monitor you until you are awake. You may need extra oxygen if your blood oxygen level is lower than it should be. Ask your healthcare provider before you take off the mask or oxygen tubing. You may be able to go  home when you are alert and can stand up. This may take 1 to 2 hours after you have received moderate sedation. You may feel tired, weak, or unsteady on your feet after you get sedation. You may also have trouble concentrating or short-term memory loss. These symptoms should go away in 24 hours or less.  Risks of moderate sedation:   You may get a headache or nausea from the medicine. You may have problems with your short-term memory. Your skin may itch or your eyes may water. You may not get enough sedation, or it may wear off quickly. You may feel restless during the procedure or as you wake up.    Too much medicine can cause deep sedation. Your healthcare provider may have trouble waking you, and you may need medicine to help you wake up. Your breathing may not be regular, or it may stop. You may need a ventilator to help you breathe. Your risk for problems with sedation is higher if you have heart or lung disease, a head injury, or drink alcohol.    Call 911 or have someone else call for any of the following:   You have sudden trouble breathing.    You cannot be woken.    Seek care immediately if:   You have a severe headache or dizziness.    Your heart is beating faster than usual.    Contact your healthcare provider if:   You have a fever.    You have nausea or are vomiting for more than 8 hours after the procedure.    Your skin is itchy, swollen, or you have a rash.    You have questions or concerns about your condition or care.    Self-care:   Have someone stay with you for 24 hours.  This person can drive you to errands and help you do things around the house. This person can also watch for problems.    Rest and do quiet activities for 24 hours.  Do not exercise, ride a bike, or play sports. Stand up slowly to prevent dizziness and falls. Take short walks around the house with another person. Slowly return to your usual activities the next day.    Do not drive or use dangerous machines or tools for 24  hours.  You may injure yourself or others. Examples include a lawnmower, saw, or drill. Do not return to work for 24 hours if you use dangerous machines or tools for work.    Do not make important decisions for 24 hours.  For example, do not sign important papers or invest money.    Drink liquids as directed.  Liquids help flush the sedation medicine out of your body. Ask how much liquid to drink each day and which liquids are best for you.    Eat small, frequent meals to prevent nausea and vomiting.  Start with clear liquids such as juice or broth. If you do not vomit after clear liquids, you can eat your usual foods.    Do not drink alcohol or take medicines that make you drowsy.  This includes medicines that help you sleep and anxiety medicines. Ask your healthcare provider if it is safe for you to take pain medicine.    Follow up with your healthcare provider as directed:  Write down your questions so you remember to ask them during your visits.  © Copyright [employer] 2023 Information is for End User's use only and may not be sold, redistributed or otherwise used for commercial purposes.  The above information is an  only. It is not intended as medical advice for individual conditions or treatments. Talk to your doctor, nurse or pharmacist before following any medical regimen to see if it is safe and effective for you.  Colonoscopy   WHAT YOU NEED TO KNOW:   What do I need to know about a colonoscopy?  A colonoscopy is a procedure to examine the inside of your colon (intestine) with a scope. A scope is a flexible tube with a small light and camera on the end. Polyps or tissue growths may be removed during your colonoscopy.       What do I need to do the week before my colonoscopy?  Give your healthcare provider a list of all the medicines, supplements, and herbs you take. You will need to stop taking medicines that contain aspirin or iron for 7 days before your colonoscopy. If you take a blood  thinner, such as warfarin, ask when you should stop taking it. Make plans for someone to drive you home after your procedure.  How do I prepare for my colonoscopy?  Your healthcare provider will have you prepare your bowels before your procedure. It is important for your bowels to be empty before your procedure to allow him or her to see your colon clearly. You will need to do the following:  Have only clear liquids for the entire day before your colonoscopy.  Clear liquids include plain gelatin, unsweetened fruit juices, clear soup, and broth. Do not drink any liquid that is blue, red, or purple.    Follow your bowel prep as directed.  There are many different preparations that can be given before a colonoscopy. With any bowel prep, stay close to the bathroom. This prep will cause your bowels to move often.    Use an enema if directed.  Your healthcare provider may tell you to use an enema to help clean out your bowels.    Do not eat or drink anything after midnight.  This will help prevent problems that can happen if you vomit while under anesthesia.    What will happen during my colonoscopy?   You will be given medicine to help you relax. You will lie on your left side and raise one or both knees toward your chest. Your healthcare provider will examine your anus and use a gloved finger to check your rectum. You may need another enema if your bowel is not empty. The scope will be lubricated and gently placed into your anus. It will then be passed through your rectum and into your colon. Water or air will be put into your colon to help clean or expand it. This is done so your healthcare provider can see your colon clearly.     Tissue samples may be taken from the walls of your bowel and sent to a lab for tests. If you have a polyp, your healthcare provider will pass a wire loop through the scope and use it to hold the polyp. The polyp is then removed from the wall of your colon. You should not feel this. The polyps  are sent to a lab for tests. Pictures of your colon may be taken during the procedure.    What will happen after my colonoscopy?  You may feel bloated or have some gas and abdominal discomfort. You may need to lie on your left side with a heating pad on your abdomen. Eat small meals until your bloating has improved.  What are the risks of a colonoscopy?  You may have pain or bleeding. You may also have a slow heartbeat, decreased blood pressure, or increased sweating. Your colon may tear due to the increased pressure from the scope and other instruments. This may cause bowel contents to leak out of your colon and into your abdomen. If this happens, you will need to stay in the hospital and have surgery on your colon.  CARE AGREEMENT:   You have the right to help plan your care. Learn about your health condition and how it may be treated. Discuss treatment options with your healthcare providers to decide what care you want to receive. You always have the right to refuse treatment. The above information is an  only. It is not intended as medical advice for individual conditions or treatments. Talk to your doctor, nurse or pharmacist before following any medical regimen to see if it is safe and effective for you.  © Copyright [employer] 2023 Information is for End User's use only and may not be sold, redistributed or otherwise used for commercial purposes.  x 1 capful daily

## 2024-01-31 NOTE — PROGRESS NOTES
[facility] - Outpatient Consultation  Rebecca Garrison 29 y.o. female MRN: [id number]  Encounter: [id number]          ASSESSMENT AND PLAN:    Rebecca Garrison is a 29 y.o. female who presents with complaint of abdominal pain.    1. Abdominal pain  2.  Rectal bleeding    Presented to the ED 1/20/2024 with right abdominal pain, bloating and nausea.  Symptoms started shortly before she went to the ED.  CT abdomen pelvis noted 2.5 cm right ovarian cyst and fluid-filled bowel loops.  Nausea resolved.  Denied any vomiting.  Denies any heartburn or reflux.  Still reporting intermittent bouts of right-sided abdominal pressure/bloating next to the umbilicus.  No aggravating factors.  Better with a BM at times.  Does have a longstanding history of chronic constipation.  BMs are 1-2 times per week and hard.  Occasionally has to strain.  Has noted intermittent bouts of bright red blood in stool and on wipe.  Denies any itching, burning or pain in rectum.  Recent CBC was normal, CMP normal other than sodium 134.  No weight loss.  Patient has follow-up with GYN.  Pain could be related to her ovarian cyst versus chronic constipation.  She is a good water drinker.  She does have fiber in her diet.  Will rule out inflammatory bowel disease, neoplasm, colon polyps, celiac disease, H. pylori.  Never had a colonoscopy.    -Celiac panel, TSH, stool for H. Pylori  -Colonoscopy with 2-day prep  -Start MiraLAX 1 capful daily  -Fiber supplement daily  -Follow-up in office after test    I reviewed with patient/family potential risks of endoscopic evaluation including possible infection, bleeding or perforation.  Patient/family verbalized understanding of potential risks and agreed to procedure(s).         ______________________________________________________________________    HPI:    Rebecca Garrison is a 29 y.o. female who presents with complaint of abdominal pain.    Presented to the ED  1/20/2024 with right-sided abdominal pain and bloating.  She also had some nausea at the time.  CT abdomen pelvis noted 2.5 cm ovarian cyst and fluid-filled bowel loops.  Labs were normal other than a sodium of 134.  She was given Zofran and a GI referral.  Reports that the nausea has resolved.  She is reporting right-sided abdominal pain around the umbilicus.  No radiation.  Feels like a pressure/bloating.  She does have a longstanding history of chronic constipation.  Reports her BMs are 1-2 times per week.  She does strain at times to have a BM.  She also has noted intermittent bouts of bright red blood in stool and on wipe.  Denies any itching, burning or pain in rectum.  Never had a colonoscopy.  Weight has been stable.  She does report generalized abdominal bloating that is better with a BM.  She is not taking anything for her constipation.  She is a good water drinker.  She does have a high-fiber diet.    CT abdomen pelvis 1/20/24-2.5 cm ovarian cyst, fluid-filled bowel loops.  CBC normal, CMP normal other than sodium of 134.    REVIEW OF SYSTEMS:    CONSTITUTIONAL: Denies any fever, chills, rigors, and weight loss.  HEENT: No earache or tinnitus. Denies hearing loss or visual disturbances.  CARDIOVASCULAR: No chest pain or palpitations.   RESPIRATORY: Denies any cough, hemoptysis, shortness of breath or dyspnea on exertion.  GASTROINTESTINAL: As noted in the History of Present Illness.   GENITOURINARY: No problems with urination. Denies any hematuria or dysuria.  NEUROLOGIC: No dizziness or vertigo, denies headaches.   MUSCULOSKELETAL: Denies any muscle or joint pain.   SKIN: Denies skin rashes or itching.   ENDOCRINE: Denies excessive thirst. Denies intolerance to heat or cold.  PSYCHOSOCIAL: Denies depression or anxiety. Denies any recent memory loss.       Historical Information   Past Medical History:   Diagnosis Date   • Abnormal Pap smear of cervix    • HPV (human papilloma virus) infection    •  "Urinary tract infection    • Varicella     had the chicken pox     Past Surgical History:   Procedure Laterality Date   • INDUCED       by dilation and curettage     Social History   Social History     Substance and Sexual Activity   Alcohol Use Yes    Comment: Socially     Social History     Substance and Sexual Activity   Drug Use No     Social History     Tobacco Use   Smoking Status Never   Smokeless Tobacco Never   Tobacco Comments    former smoker/ light smoker per allscripts      Family History   Problem Relation Age of Onset   • Asthma Mother    • Stroke Father    • Dementia Father    • Asthma Sister    • No Known Problems Brother    • Aneurysm Maternal Grandmother    • Hypertension Maternal Grandfather    • Stroke Paternal Grandfather    • No Known Problems Sister    • No Known Problems Sister    • No Known Problems Brother        Meds/Allergies       Current Outpatient Medications:   •  aspirin-acetaminophen-caffeine (Excedrin Migraine) 250-250-65 MG per tablet  •  dicyclomine (BENTYL) 20 mg tablet  •  polyethylene glycol (Golytely) 4000 mL solution  •  ibuprofen (MOTRIN) 600 mg tablet  •  metoclopramide (REGLAN) 10 mg tablet  •  naproxen (NAPROSYN) 500 mg tablet  •  norgestimate-ethinyl estradiol (Tri Femynor) 0.18/0.215/0.25 MG-35 MCG per tablet  •  ondansetron (Zofran ODT) 4 mg disintegrating tablet  •  Prenatal Vit-Fe Fumarate-FA (Prenatal Complete) 14-0.4 MG TABS    No Known Allergies        Objective     Blood pressure 122/66, pulse 70, temperature 98.8 °F (37.1 °C), height 5' 6\" (1.676 m), weight 85.5 kg (188 lb 9.6 oz), SpO2 98%, unknown if currently breastfeeding. Body mass index is 30.44 kg/m².        PHYSICAL EXAM:      General Appearance:   Alert, cooperative, no distress   HEENT:   Normocephalic, atraumatic, anicteric.     Neck:  Supple, symmetrical, trachea midline   Lungs:   Clear to auscultation bilaterally; no rales, rhonchi or wheezing; respirations unlabored    Heart::   Regular " rate and rhythm; no murmur, rub, or gallop.   Abdomen:   Soft, non-tender, non-distended; normal bowel sounds; no masses, no organomegaly    Genitalia:   Deferred    Rectal:   Deferred    Extremities:  No cyanosis, clubbing or edema    Pulses:  2+ and symmetric    Skin:  No jaundice, rashes, or lesions    Lymph nodes:  No palpable cervical lymphadenopathy        Lab Results:   No visits with results within 1 Day(s) from this visit.   Latest known visit with results is:   Admission on 01/20/2024, Discharged on 01/20/2024   Component Date Value   • Preg, Serum 01/20/2024 Negative    • EXT Preg Test, Ur 01/20/2024 Negative    • Control 01/20/2024 Valid    • Color, UA 01/20/2024 Yellow    • Clarity, UA 01/20/2024 Clear    • Specific Gravity, UA 01/20/2024 >=1.030    • pH, UA 01/20/2024 6.0    • Leukocytes, UA 01/20/2024 Negative    • Nitrite, UA 01/20/2024 Negative    • Protein, UA 01/20/2024 Negative    • Glucose, UA 01/20/2024 Negative    • Ketones, UA 01/20/2024 80 (3+) (A)    • Urobilinogen, UA 01/20/2024 0.2    • Bilirubin, UA 01/20/2024 Negative    • Occult Blood, UA 01/20/2024 Trace-Intact (A)    • WBC 01/20/2024 9.49    • RBC 01/20/2024 4.48    • Hemoglobin 01/20/2024 13.5    • Hematocrit 01/20/2024 40.4    • MCV 01/20/2024 90    • MCH 01/20/2024 30.1    • MCHC 01/20/2024 33.4    • RDW 01/20/2024 12.8    • MPV 01/20/2024 8.4 (L)    • Platelets 01/20/2024 386    • nRBC 01/20/2024 0    • Neutrophils Relative 01/20/2024 63    • Immat GRANS % 01/20/2024 0    • Lymphocytes Relative 01/20/2024 22    • Monocytes Relative 01/20/2024 9    • Eosinophils Relative 01/20/2024 5    • Basophils Relative 01/20/2024 1    • Neutrophils Absolute 01/20/2024 6.08    • Immature Grans Absolute 01/20/2024 0.02    • Lymphocytes Absolute 01/20/2024 2.08    • Monocytes Absolute 01/20/2024 0.82    • Eosinophils Absolute 01/20/2024 0.43    • Basophils Absolute 01/20/2024 0.06    • Sodium 01/20/2024 134 (L)    • Potassium 01/20/2024 3.6    •  "Chloride 01/20/2024 102    • CO2 01/20/2024 23    • ANION GAP 01/20/2024 9    • BUN 01/20/2024 8    • Creatinine 01/20/2024 0.80    • Glucose 01/20/2024 93    • Calcium 01/20/2024 9.3    • eGFR 01/20/2024 99    • Total Bilirubin 01/20/2024 0.34    • Bilirubin, Direct 01/20/2024 0.11    • Alkaline Phosphatase 01/20/2024 54    • AST 01/20/2024 17    • ALT 01/20/2024 12    • Total Protein 01/20/2024 8.0    • Albumin 01/20/2024 4.2    • Lipase 01/20/2024 29    • RBC, UA 01/20/2024 None Seen    • WBC, UA 01/20/2024 0-1    • Epithelial Cells 01/20/2024 Moderate (A)    • Bacteria, UA 01/20/2024 Moderate (A)    • MUCUS THREADS 01/20/2024 Moderate (A)        Lab Results   Component Value Date    WBC 9.49 01/20/2024    HGB 13.5 01/20/2024    HCT 40.4 01/20/2024    MCV 90 01/20/2024     01/20/2024       Lab Results   Component Value Date    SODIUM 134 (L) 01/20/2024    K 3.6 01/20/2024     01/20/2024    CO2 23 01/20/2024    AGAP 9 01/20/2024    BUN 8 01/20/2024    CREATININE 0.80 01/20/2024    GLUC 93 01/20/2024    CALCIUM 9.3 01/20/2024    AST 17 01/20/2024    ALT 12 01/20/2024    ALKPHOS 54 01/20/2024    TP 8.0 01/20/2024    TBILI 0.34 01/20/2024    EGFR 99 01/20/2024       No results found for: \"CRP\"    Lab Results   Component Value Date    LFG2TFKZVGJW 0.721 12/15/2021         Radiology Results:   US pelvis complete w transvaginal    Result Date: 1/20/2024  Narrative: PELVIC ULTRASOUND, COMPLETE INDICATION: The patient is 28 years old. r/o torsion. RLQ pain. Ovarian cyst. LMP 1/9/2024. COMPARISON: CT performed earlier today; prior ultrasound dated January 8, 2021 TECHNIQUE: Transabdominal pelvic ultrasound was performed in sagittal and transverse planes with a curvilinear transducer. Additional transvaginal imaging was performed to better evaluate the endometrium and ovaries. Imaging included volumetric sweeps as  well as traditional still imaging technique. FINDINGS: Urinary bladder almost empty. UTERUS: The " uterus is anteverted and anteflexed. In position, measuring 8.4 x 4.5 x 6.4 cm. Volume = 127 mL The uterus has a normal contour and echotexture. The cervix appears within normal limits. ENDOMETRIUM: The endometrial echo complex has an AP caliber of 6.0 mm. Its appearance is within normal limits for age and cycle and shows no filling defects. OVARIES/ADNEXA: Right ovary: 3.1 x 3.0 x 2.3 cm. 11.2 mL. Cystic structure in the right ovary measuring 2.6 cm likely a corpus luteum. Ovarian Doppler flow is within normal limits. No suspicious ovarian or adnexal abnormality. Left ovary: 2.4 x 1.1 x 1.0 cm. 1.5 mL. Ovarian Doppler flow is within normal limits. No suspicious ovarian or adnexal abnormality. OTHER: No free fluid or loculated fluid collections.     Impression: Mildly enlarged uterus. Right ovarian corpus luteal cyst. No evidence of ovarian torsion at time of imaging. Workstation performed: [id number]     CT abdomen pelvis with contrast    Result Date: 1/20/2024  Narrative: CT ABDOMEN AND PELVIS WITH IV CONTRAST INDICATION: RLQ pain. COMPARISON: 4/16/2021 TECHNIQUE: CT examination of the abdomen and pelvis was performed. Multiplanar 2D reformatted images were created from the source data. This examination, like all CT scans performed in the [facility], was performed utilizing techniques to minimize radiation dose exposure, including the use of iterative reconstruction and automated exposure control. Radiation dose length product (DLP) for this visit: 528 mGy-cm IV Contrast: 100 mL of iohexol (OMNIPAQUE) Enteric Contrast: Not administered. FINDINGS: ABDOMEN LOWER CHEST: No clinically significant abnormality in the visualized lower chest. LIVER/BILIARY TREE: Unremarkable. GALLBLADDER: No calcified gallstones. No pericholecystic inflammatory change. SPLEEN: Unremarkable. PANCREAS: Unremarkable. ADRENAL GLANDS: Unremarkable. KIDNEYS/URETERS: Unremarkable. No hydronephrosis. STOMACH AND BOWEL:  Nonspecific fluid-filled small bowel loops. APPENDIX: No findings to suggest appendicitis. The appendix is best seen on sagittal imaging. There are no right lower quadrant inflammatory changes. ABDOMINOPELVIC CAVITY: No ascites. No pneumoperitoneum. No lymphadenopathy. VESSELS: Unremarkable for patient's age. PELVIS REPRODUCTIVE ORGANS: There is a right adnexal cyst measuring 2.5 x 2.4 cm.. There is a small amount of pelvic free fluid. URINARY BLADDER: Unremarkable. ABDOMINAL WALL/INGUINAL REGIONS: Unremarkable. BONES: No acute fracture or suspicious osseous lesion.     Impression: No evidence for acute appendicitis. 2.5 cm right ovarian cyst with a small amount of pelvic free fluid. Nonspecific fluid-filled loops of small bowel without evidence for bowel obstruction. Workstation performed: [id number]

## 2024-02-29 ENCOUNTER — HOSPITAL ENCOUNTER (EMERGENCY)
Facility: HOSPITAL | Age: 29
Discharge: HOME | End: 2024-02-29
Attending: EMERGENCY MEDICINE
Payer: COMMERCIAL

## 2024-02-29 VITALS
HEIGHT: 66 IN | SYSTOLIC BLOOD PRESSURE: 131 MMHG | WEIGHT: 179.6 LBS | RESPIRATION RATE: 18 BRPM | HEART RATE: 80 BPM | OXYGEN SATURATION: 99 % | TEMPERATURE: 98.6 F | DIASTOLIC BLOOD PRESSURE: 87 MMHG | BODY MASS INDEX: 28.87 KG/M2

## 2024-02-29 DIAGNOSIS — R11.10 VOMITING: ICD-10-CM

## 2024-02-29 DIAGNOSIS — J11.1 INFLUENZA: Primary | ICD-10-CM

## 2024-02-29 LAB
ALBUMIN SERPL BCP-MCNC: 3.9 G/DL (ref 3.5–5)
ALP SERPL-CCNC: 46 U/L (ref 34–104)
ALT SERPL W P-5'-P-CCNC: 80 U/L (ref 7–52)
ANION GAP SERPL CALCULATED.3IONS-SCNC: 9 MMOL/L
AST SERPL W P-5'-P-CCNC: 45 U/L (ref 13–39)
BASOPHILS # BLD MANUAL: 0 THOUSAND/UL (ref 0–0.1)
BASOPHILS NFR MAR MANUAL: 0 % (ref 0–1)
BILIRUB SERPL-MCNC: 0.39 MG/DL (ref 0.2–1)
BUN SERPL-MCNC: 10 MG/DL (ref 5–25)
CALCIUM SERPL-MCNC: 9 MG/DL (ref 8.4–10.2)
CHLORIDE SERPL-SCNC: 104 MMOL/L (ref 96–108)
CO2 SERPL-SCNC: 23 MMOL/L (ref 21–32)
CREAT SERPL-MCNC: 0.85 MG/DL (ref 0.6–1.3)
EOSINOPHIL # BLD MANUAL: 0.06 THOUSAND/UL (ref 0–0.4)
EOSINOPHIL NFR BLD MANUAL: 1 % (ref 0–6)
ERYTHROCYTE [DISTWIDTH] IN BLOOD BY AUTOMATED COUNT: 12.4 % (ref 11.6–15.1)
FLUAV RNA RESP QL NAA+PROBE: NEGATIVE
FLUBV RNA RESP QL NAA+PROBE: POSITIVE
GFR SERPL CREATININE-BSD FRML MDRD: 92 ML/MIN/1.73SQ M
GLUCOSE SERPL-MCNC: 95 MG/DL (ref 65–140)
HCT VFR BLD AUTO: 41.7 % (ref 34.8–46.1)
HGB BLD-MCNC: 14.3 G/DL (ref 11.5–15.4)
LYMPHOCYTES # BLD AUTO: 2.11 THOUSAND/UL (ref 0.6–4.47)
LYMPHOCYTES # BLD AUTO: 28 % (ref 14–44)
MCH RBC QN AUTO: 29.9 PG (ref 26.8–34.3)
MCHC RBC AUTO-ENTMCNC: 34.3 G/DL (ref 31.4–37.4)
MCV RBC AUTO: 87 FL (ref 82–98)
MONOCYTES # BLD AUTO: 0.68 THOUSAND/UL (ref 0–1.22)
MONOCYTES NFR BLD: 12 % (ref 4–12)
NEUTROPHILS # BLD MANUAL: 2.85 THOUSAND/UL (ref 1.85–7.62)
NEUTS BAND NFR BLD MANUAL: 1 % (ref 0–8)
NEUTS SEG NFR BLD AUTO: 49 % (ref 43–75)
PLATELET # BLD AUTO: 355 THOUSANDS/UL (ref 149–390)
PLATELET BLD QL SMEAR: ADEQUATE
PMV BLD AUTO: 8.4 FL (ref 8.9–12.7)
POTASSIUM SERPL-SCNC: 3.4 MMOL/L (ref 3.5–5.3)
PROT SERPL-MCNC: 7.6 G/DL (ref 6.4–8.4)
RBC # BLD AUTO: 4.78 MILLION/UL (ref 3.81–5.12)
RBC MORPH BLD: NORMAL
RSV RNA RESP QL NAA+PROBE: NEGATIVE
SARS-COV-2 RNA RESP QL NAA+PROBE: NEGATIVE
SODIUM SERPL-SCNC: 136 MMOL/L (ref 135–147)
VARIANT LYMPHS # BLD AUTO: 9 %
WBC # BLD AUTO: 5.7 THOUSAND/UL (ref 4.31–10.16)

## 2024-02-29 PROCEDURE — 99283 EMERGENCY DEPT VISIT LOW MDM: CPT

## 2024-02-29 PROCEDURE — 99284 EMERGENCY DEPT VISIT MOD MDM: CPT | Performed by: EMERGENCY MEDICINE

## 2024-02-29 PROCEDURE — 96374 THER/PROPH/DIAG INJ IV PUSH: CPT

## 2024-02-29 PROCEDURE — 85007 BL SMEAR W/DIFF WBC COUNT: CPT | Performed by: EMERGENCY MEDICINE

## 2024-02-29 PROCEDURE — 85027 COMPLETE CBC AUTOMATED: CPT | Performed by: EMERGENCY MEDICINE

## 2024-02-29 PROCEDURE — 36415 COLL VENOUS BLD VENIPUNCTURE: CPT | Performed by: EMERGENCY MEDICINE

## 2024-02-29 PROCEDURE — 0241U HB NFCT DS VIR RESP RNA 4 TRGT: CPT | Performed by: EMERGENCY MEDICINE

## 2024-02-29 PROCEDURE — 96361 HYDRATE IV INFUSION ADD-ON: CPT

## 2024-02-29 PROCEDURE — 80053 COMPREHEN METABOLIC PANEL: CPT | Performed by: EMERGENCY MEDICINE

## 2024-02-29 RX ORDER — ONDANSETRON 4 MG/1
4 TABLET, ORALLY DISINTEGRATING ORAL EVERY 6 HOURS PRN
Qty: 10 TABLET | Refills: 0 | Status: SHIPPED | OUTPATIENT
Start: 2024-02-29

## 2024-02-29 RX ORDER — POTASSIUM CHLORIDE 20 MEQ/1
40 TABLET, EXTENDED RELEASE ORAL ONCE
Status: COMPLETED | OUTPATIENT
Start: 2024-02-29 | End: 2024-02-29

## 2024-02-29 RX ORDER — ONDANSETRON 2 MG/ML
4 INJECTION INTRAMUSCULAR; INTRAVENOUS ONCE
Status: COMPLETED | OUTPATIENT
Start: 2024-02-29 | End: 2024-02-29

## 2024-02-29 RX ADMIN — POTASSIUM CHLORIDE 40 MEQ: 1500 TABLET, EXTENDED RELEASE ORAL at 13:49

## 2024-02-29 RX ADMIN — ONDANSETRON 4 MG: 2 INJECTION INTRAMUSCULAR; INTRAVENOUS at 12:15

## 2024-02-29 RX ADMIN — SODIUM CHLORIDE 1000 ML: 0.9 INJECTION, SOLUTION INTRAVENOUS at 12:15

## 2024-02-29 NOTE — ED NOTES
Patient to finish IVF's, not done, arm was bent,educated in regards to keeping arm straight, when fluids are finished she will be discharged.     Emelia Hsu RN  02/29/24 [id number]

## 2024-02-29 NOTE — Clinical Note
Rebecca Méndez was seen and treated in our emergency department on 2/29/2024.                Diagnosis:     Rebecca  may return to work on return date.    She may return on this date: 03/04/2024         If you have any questions or concerns, please don't hesitate to call.      Stephon Yeboah MD    ______________________________           _______________          _______________  Hospital Representative                              Date                                Time

## 2024-02-29 NOTE — ED PROVIDER NOTES
History  Chief Complaint   Patient presents with    Flu Symptoms     ( Patient's name is Rebecca and registration will correct in EPIC ) States started on 2/23 with chills, cough, runny nose, diarrhea and vomiting. Home covid test is negative.      Patient states she was exposed to a coworker who was positive for COVID.  She developed symptoms started with fever chills body aches 6 days ago.  The patient also developed diarrhea and vomiting the next day.  She is complaining of generalized malaise and weakness.  She is lightheaded and feels dehydrated.  Took a home COVID test on the second day of symptoms and was reported negative.        None       History reviewed. No pertinent past medical history.    History reviewed. No pertinent surgical history.    History reviewed. No pertinent family history.  I have reviewed and agree with the history as documented.    E-Cigarette/Vaping    E-Cigarette Use Never User      E-Cigarette/Vaping Substances     Social History     Tobacco Use    Smoking status: Never    Smokeless tobacco: Never   Vaping Use    Vaping status: Never Used   Substance Use Topics    Alcohol use: Yes     Comment: special occasions    Drug use: Never       Review of Systems   Constitutional:  Positive for appetite change, chills, fatigue and fever. Negative for diaphoresis.   HENT:  Positive for congestion. Negative for sore throat.    Eyes:  Negative for visual disturbance.   Respiratory:  Positive for cough. Negative for wheezing.    Cardiovascular:  Negative for chest pain and leg swelling.   Gastrointestinal:  Positive for diarrhea, nausea and vomiting. Negative for abdominal pain and blood in stool.   Genitourinary:  Negative for dysuria.   Musculoskeletal:  Positive for arthralgias and myalgias.   Skin:  Negative for rash.   Neurological:  Positive for weakness, light-headedness and headaches. Negative for syncope.   Hematological:  Does not bruise/bleed easily.   Psychiatric/Behavioral:  Negative  for confusion.    All other systems reviewed and are negative.      Physical Exam  Physical Exam  Vitals and nursing note reviewed.   Constitutional:       Appearance: Normal appearance. She is ill-appearing.   HENT:      Head: Normocephalic.      Right Ear: External ear normal.      Left Ear: External ear normal.      Nose: Nose normal.      Mouth/Throat:      Mouth: Mucous membranes are moist.   Eyes:      Conjunctiva/sclera: Conjunctivae normal.   Cardiovascular:      Rate and Rhythm: Normal rate and regular rhythm.      Pulses: Normal pulses.   Pulmonary:      Effort: Pulmonary effort is normal.      Breath sounds: Normal breath sounds. No wheezing or rhonchi.   Abdominal:      General: Bowel sounds are normal.      Palpations: Abdomen is soft.   Musculoskeletal:         General: Normal range of motion.      Cervical back: Normal range of motion.   Skin:     General: Skin is warm and dry.      Capillary Refill: Capillary refill takes less than 2 seconds.   Neurological:      General: No focal deficit present.      Mental Status: She is alert and oriented to person, place, and time.   Psychiatric:         Mood and Affect: Mood normal.         Behavior: Behavior normal.         Vital Signs  ED Triage Vitals [02/29/24 1124]   Temperature Pulse Respirations Blood Pressure SpO2   98.6 °F (37 °C) 80 18 131/87 99 %      Temp Source Heart Rate Source Patient Position - Orthostatic VS BP Location FiO2 (%)   Tympanic Monitor Sitting Left arm --      Pain Score       No Pain           Vitals:    02/29/24 1124   BP: 131/87   Pulse: 80   Patient Position - Orthostatic VS: Sitting         Visual Acuity      ED Medications  Medications   sodium chloride 0.9 % bolus 1,000 mL (0 mL Intravenous Stopped 2/29/24 1501)   ondansetron (ZOFRAN) injection 4 mg (4 mg Intravenous Given 2/29/24 1215)   potassium chloride (Klor-Con M20) CR tablet 40 mEq (40 mEq Oral Given 2/29/24 1349)       Diagnostic Studies  Results Reviewed        Procedure Component Value Units Date/Time    FLU/RSV/COVID - if FLU/RSV clinically relevant [[id number]]  (Abnormal) Collected: 02/29/24 1211    Lab Status: Final result Specimen: Nares from Nose Updated: 02/29/24 1313     SARS-CoV-2 Negative     INFLUENZA A PCR Negative     INFLUENZA B PCR Positive     RSV PCR Negative    Narrative:      FOR PEDIATRIC PATIENTS - copy/paste COVID Guidelines URL to browser: [email or web address]    SARS-CoV-2 assay is a Nucleic Acid Amplification assay intended for the  qualitative detection of nucleic acid from SARS-CoV-2 in nasopharyngeal  swabs. Results are for the presumptive identification of SARS-CoV-2 RNA.    Positive results are indicative of infection with SARS-CoV-2, the virus  causing COVID-19, but do not rule out bacterial infection or co-infection  with other viruses. Laboratories within the [place] and its  territories are required to report all positive results to the appropriate  public health authorities. Negative results do not preclude SARS-CoV-2  infection and should not be used as the sole basis for treatment or other  patient management decisions. Negative results must be combined with  clinical observations, patient history, and epidemiological information.  This test has not been FDA cleared or approved.    This test has been authorized by FDA under an Emergency Use Authorization  (EUA). This test is only authorized for the duration of time the  declaration that circumstances exist justifying the authorization of the  emergency use of an in vitro diagnostic tests for detection of SARS-CoV-2  virus and/or diagnosis of COVID-19 infection under section 564(b)(1) of  the Act, 21 U.S.C. 360bbb-3(b)(1), unless the authorization is terminated  or revoked sooner. The test has been validated but independent review by FDA  and CLIA is pending.    Test performed using [employer]: This RT-PCR assay targets  N2,  a region unique to SARS-CoV-2. A conserved region in the E-gene was chosen  for pan-Sarbecovirus detection which includes SARS-CoV-2.    According to CMS-2020-01-R, this platform meets the definition of high-throughput technology.    RBC Morphology Reflex Test [[id number]] Collected: 02/29/24 1211    Lab Status: Final result Specimen: Blood from Arm, Right Updated: 02/29/24 1301    CBC and differential [[id number]]  (Abnormal) Collected: 02/29/24 1211    Lab Status: Final result Specimen: Blood from Arm, Right Updated: 02/29/24 1252     WBC 5.70 Thousand/uL      RBC 4.78 Million/uL      Hemoglobin 14.3 g/dL      Hematocrit 41.7 %      MCV 87 fL      MCH 29.9 pg      MCHC 34.3 g/dL      RDW 12.4 %      MPV 8.4 fL      Platelets 355 Thousands/uL     Narrative:      This is an appended report.  These results have been appended to a previously verified report.    Manual Differential(PHLEBS Do Not Order) [[id number]]  (Abnormal) Collected: 02/29/24 1211    Lab Status: Final result Specimen: Blood from Arm, Right Updated: 02/29/24 1252     Segmented % 49 %      Bands % 1 %      Lymphocytes % 28 %      Monocytes % 12 %      Eosinophils, % 1 %      Basophils % 0 %      Atypical Lymphocytes % 9 %      Absolute Neutrophils 2.85 Thousand/uL      Lymphocytes Absolute 2.11 Thousand/uL      Monocytes Absolute 0.68 Thousand/uL      Eosinophils Absolute 0.06 Thousand/uL      Basophils Absolute 0.00 Thousand/uL      Total Counted --     RBC Morphology Normal     Platelet Estimate Adequate    Comprehensive metabolic panel [[id number]]  (Abnormal) Collected: 02/29/24 1211    Lab Status: Final result Specimen: Blood from Arm, Right Updated: 02/29/24 1233     Sodium 136 mmol/L      Potassium 3.4 mmol/L      Chloride 104 mmol/L      CO2 23 mmol/L      ANION GAP 9 mmol/L      BUN 10 mg/dL      Creatinine 0.85 mg/dL      Glucose 95 mg/dL      Calcium 9.0 mg/dL      AST 45 U/L      ALT 80 U/L      Alkaline Phosphatase 46 U/L      Total  Protein 7.6 g/dL      Albumin 3.9 g/dL      Total Bilirubin 0.39 mg/dL      eGFR 92 ml/min/1.73sq m     Narrative:      National Kid[employer] Disease Foundation guidelines for Chronic Kidney Disease (CKD):     Stage 1 with normal or high GFR (GFR > 90 mL/min/1.73 square meters)    Stage 2 Mild CKD (GFR = 60-89 mL/min/1.73 square meters)    Stage 3A Moderate CKD (GFR = 45-59 mL/min/1.73 square meters)    Stage 3B Moderate CKD (GFR = 30-44 mL/min/1.73 square meters)    Stage 4 Severe CKD (GFR = 15-29 mL/min/1.73 square meters)    Stage 5 End Stage CKD (GFR <15 mL/min/1.73 square meters)  Note: GFR calculation is accurate only with a steady state creatinine                   No orders to display              Procedures  Procedures         ED Course                               SBIRT 22yo+      Flowsheet Row Most Recent Value   Initial Alcohol Screen: US AUDIT-C     1. How often do you have a drink containing alcohol? 1 Filed at: 02/29/2024 1127   2. How many drinks containing alcohol do you have on a typical day you are drinking?  0 Filed at: 02/29/2024 1127   3b. FEMALE Any Age, or MALE 65+: How often do you have 4 or more drinks on one occassion? 0 Filed at: 02/29/2024 1127   Audit-C Score 1 Filed at: 02/29/2024 1127   VIDHI: How many times in the past year have you...    Used an illegal drug or used a prescription medication for non-medical reasons? Never Filed at: 02/29/2024 1127                      Medical Decision Making  Patient is a viral symptom complex.  Will hydrate check electrolytes provide antiemetics and do viral testing    Amount and/or Complexity of Data Reviewed  Labs: ordered.    Risk  Prescription drug management.             Disposition  Final diagnoses:   Influenza   Vomiting     Time reflects when diagnosis was documented in both MDM as applicable and the Disposition within this note       Time User Action Codes Description Comment    2/29/2024  1:39 PM Stephon Yeboah Add [J11.1] Influenza      2/29/2024  1:43 PM Stephon Yeboah Add [R11.10] Vomiting           ED Disposition       ED Disposition   Discharge    Condition   Stable    Date/Time   Thu Feb 29, 2024 1339    Comment   Rebecca Méndez discharge to home/self care.                   Follow-up Information       Follow up With Specialties Details Why Contact Info    JENNY Grissom MD Internal Medicine Schedule an appointment as soon as possible for a visit   [address] [place] [address].  [address]  [place] [address]  [phone]              Discharge Medication List as of 2/29/2024  3:03 PM        START taking these medications    Details   ondansetron (Zofran ODT) 4 mg disintegrating tablet Take 1 tablet (4 mg total) by mouth every 6 (six) hours as needed for nausea or vomiting, Starting Thu 2/29/2024, Normal             No discharge procedures on file.    PDMP Review       None            ED Provider  Electronically Signed by             Stephon Yeboah MD  02/29/24 9498

## 2024-03-01 ENCOUNTER — ANESTHESIA EVENT (OUTPATIENT)
Dept: ANESTHESIOLOGY | Facility: HOSPITAL | Age: 29
End: 2024-03-01

## 2024-03-01 ENCOUNTER — ANESTHESIA (OUTPATIENT)
Dept: ANESTHESIOLOGY | Facility: HOSPITAL | Age: 29
End: 2024-03-01

## 2024-03-10 RX ORDER — SODIUM CHLORIDE 9 MG/ML
125 INJECTION, SOLUTION INTRAVENOUS CONTINUOUS
OUTPATIENT
Start: 2024-03-10

## 2024-03-20 ENCOUNTER — TELEPHONE (OUTPATIENT)
Dept: GASTROENTEROLOGY | Facility: CLINIC | Age: 29
End: 2024-03-20